# Patient Record
Sex: MALE | Race: WHITE | Employment: FULL TIME | URBAN - METROPOLITAN AREA
[De-identification: names, ages, dates, MRNs, and addresses within clinical notes are randomized per-mention and may not be internally consistent; named-entity substitution may affect disease eponyms.]

---

## 2017-01-03 ENCOUNTER — APPOINTMENT (OUTPATIENT)
Dept: RADIATION ONCOLOGY | Facility: HOSPITAL | Age: 62
End: 2017-01-03
Attending: RADIOLOGY
Payer: COMMERCIAL

## 2017-01-06 ENCOUNTER — OFFICE VISIT (OUTPATIENT)
Dept: RADIATION ONCOLOGY | Facility: HOSPITAL | Age: 62
End: 2017-01-06
Attending: RADIOLOGY
Payer: COMMERCIAL

## 2017-01-06 VITALS
RESPIRATION RATE: 18 BRPM | BODY MASS INDEX: 28.75 KG/M2 | HEART RATE: 63 BPM | WEIGHT: 224 LBS | HEIGHT: 74 IN | DIASTOLIC BLOOD PRESSURE: 81 MMHG | OXYGEN SATURATION: 100 % | TEMPERATURE: 98 F | SYSTOLIC BLOOD PRESSURE: 139 MMHG

## 2017-01-06 PROCEDURE — 99212 OFFICE O/P EST SF 10 MIN: CPT

## 2017-01-06 NOTE — PROGRESS NOTES
Primary language:  English  Language line required?  no  Comprehension Ability:  excellent  Able to read?  yes  Able to write? yes  Communication tools:  na  Patient's ability to learn:  excellent  Readiness to learn:   Motivated  Learning preferences:  Duncan Martin

## 2017-01-06 NOTE — PROGRESS NOTES
RADIATION ONCOLOGY NOTE    DATE OF VISIT: 1/6/2017  DIAGNOSIS :   Stage IV, [id number] adenocarcinoma of the prostate, s/p RRP/LND with +LN/+ margins and KEVIN, with detectable PSA post-op, currently started salvage hormonal therapy, for further adjuvant XRT. tablet by mouth once daily. Disp:  Rfl: 2       ALLERGIES :   No Known Allergies    PAST MEDICAL HISTORY:   has a past medical history of Pain in joint, lower leg; Cervicalgia; and Prostate cancer ([facility] [address]  He also has no past medical history of Venereal dise with Stage IV, [id number] adenocarcinoma of the prostate, s/p RRP/LND with +LN/+ margins and KEVIN, with detectable PSA post-op, currently started salvage hormonal therapy. Given his high risk features, I have discussed the role of  further adjuvant XRT.

## 2017-01-06 NOTE — PROGRESS NOTES
Primary language:  English  Language line required?  no  Comprehension Ability:  good  Able to read?  yes  Able to write? yes  Communication tools:  na  Patient's ability to learn:  good  Readiness to learn:   Motivated  Learning preferences:  Discussion

## 2017-01-06 NOTE — PROGRESS NOTES
Fatigue Plan Of Care:    Problem:  Fatigue    Problems related to:    Disease process  Side effect of therapy    Interventions:  Prioritize daily activities  Discuss methods to balance rest and activity  Promote excercise within patient's capability  Encou

## 2017-01-06 NOTE — PROGRESS NOTES
Nursing Consultation Note  Patient: Samantha Ramírez  YOB: 1955  Age: 62year old  Radiation Oncologist: Dr. Layla Gonzalez  Referring Physician: No ref. provider found  Diagnosis:No diagnosis found.   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 Pt accompanied by his wife. Discussed potential side effects of male pelvis radiation, including urinary changes, possible loose stools/diarrhea, fatigue and skin changes.

## 2017-01-06 NOTE — PROGRESS NOTES
Knowledge Deficit Plan Of Care:    Problem:  Knowledge Deficit    Problems related to:    Radiation therapy  Disease process    Interventions:  Assess patient knowledge level  Show Cyberknife video presentation  Assess knowledge needs  Instruct on the dise

## 2017-01-06 NOTE — PROGRESS NOTES
Diarrhea Plan Of Care:    Problem:  Diarrhea    Problems related to:    Side effects of treatment    Interventions:  Monitor bowel function  Instruct patient/family on low fat / low fiber diet  Avoid irritating foods  Provide skin care measures  Encourage

## 2017-01-17 PROCEDURE — 77334 RADIATION TREATMENT AID(S): CPT | Performed by: RADIOLOGY

## 2017-01-19 PROCEDURE — 77338 DESIGN MLC DEVICE FOR IMRT: CPT | Performed by: RADIOLOGY

## 2017-01-19 PROCEDURE — 77301 RADIOTHERAPY DOSE PLAN IMRT: CPT | Performed by: RADIOLOGY

## 2017-01-19 PROCEDURE — 77300 RADIATION THERAPY DOSE PLAN: CPT | Performed by: RADIOLOGY

## 2017-01-20 PROCEDURE — 77338 DESIGN MLC DEVICE FOR IMRT: CPT | Performed by: RADIOLOGY

## 2017-01-20 PROCEDURE — 77334 RADIATION TREATMENT AID(S): CPT | Performed by: RADIOLOGY

## 2017-01-24 ENCOUNTER — TELEPHONE (OUTPATIENT)
Dept: HEMATOLOGY/ONCOLOGY | Facility: HOSPITAL | Age: 62
End: 2017-01-24

## 2017-01-24 NOTE — TELEPHONE ENCOUNTER
PLEASE CALL PT TODAY AT [facility] HE WILL BEGIN RADIATION THERAPY-FLORENCIO

## 2017-01-25 PROCEDURE — 77280 THER RAD SIMULAJ FIELD SMPL: CPT | Performed by: RADIOLOGY

## 2017-01-25 PROCEDURE — 77385 HC IMRT SIMPLE: CPT | Performed by: RADIOLOGY

## 2017-01-26 PROCEDURE — 77385 HC IMRT SIMPLE: CPT | Performed by: RADIOLOGY

## 2017-01-27 PROCEDURE — 77336 RADIATION PHYSICS CONSULT: CPT | Performed by: RADIOLOGY

## 2017-01-27 PROCEDURE — 77385 HC IMRT SIMPLE: CPT | Performed by: RADIOLOGY

## 2017-01-30 ENCOUNTER — OFFICE VISIT (OUTPATIENT)
Dept: RADIATION ONCOLOGY | Facility: HOSPITAL | Age: 62
End: 2017-01-30
Attending: RADIOLOGY
Payer: COMMERCIAL

## 2017-01-30 VITALS
HEIGHT: 74 IN | HEART RATE: 71 BPM | BODY MASS INDEX: 28.83 KG/M2 | SYSTOLIC BLOOD PRESSURE: 121 MMHG | RESPIRATION RATE: 16 BRPM | DIASTOLIC BLOOD PRESSURE: 65 MMHG | WEIGHT: 224.63 LBS

## 2017-01-30 DIAGNOSIS — C61 PROSTATE CANCER (HCC): Primary | ICD-10-CM

## 2017-01-30 PROCEDURE — 77385 HC IMRT SIMPLE: CPT | Performed by: RADIOLOGY

## 2017-01-30 NOTE — PROGRESS NOTES
[facility] Radiation Treatment Management Note 1-5    Patient:  Serge Duverney  Age:  62year old  Visit Diagnosis:    1.  Prostate cancer [facility])      Primary Rad/Onc:  Dr. Laura Goodman      Site Delivered Dose (Gy) Prescribed Dose (Gy) Lidia Herrera

## 2017-01-31 PROCEDURE — 77385 HC IMRT SIMPLE: CPT | Performed by: RADIOLOGY

## 2017-02-01 ENCOUNTER — APPOINTMENT (OUTPATIENT)
Dept: RADIATION ONCOLOGY | Facility: HOSPITAL | Age: 62
End: 2017-02-01
Attending: RADIOLOGY
Payer: COMMERCIAL

## 2017-02-01 PROCEDURE — 77385 HC IMRT SIMPLE: CPT | Performed by: RADIOLOGY

## 2017-02-02 PROCEDURE — 77385 HC IMRT SIMPLE: CPT | Performed by: RADIOLOGY

## 2017-02-03 PROCEDURE — 77385 HC IMRT SIMPLE: CPT | Performed by: RADIOLOGY

## 2017-02-03 PROCEDURE — 77336 RADIATION PHYSICS CONSULT: CPT | Performed by: RADIOLOGY

## 2017-02-06 ENCOUNTER — OFFICE VISIT (OUTPATIENT)
Dept: RADIATION ONCOLOGY | Facility: HOSPITAL | Age: 62
End: 2017-02-06
Attending: RADIOLOGY
Payer: COMMERCIAL

## 2017-02-06 VITALS
WEIGHT: 229.19 LBS | SYSTOLIC BLOOD PRESSURE: 123 MMHG | DIASTOLIC BLOOD PRESSURE: 63 MMHG | RESPIRATION RATE: 16 BRPM | BODY MASS INDEX: 29 KG/M2 | HEART RATE: 76 BPM

## 2017-02-06 DIAGNOSIS — C61 PROSTATE CANCER (HCC): Primary | ICD-10-CM

## 2017-02-06 PROCEDURE — 77385 HC IMRT SIMPLE: CPT | Performed by: RADIOLOGY

## 2017-02-06 NOTE — PROGRESS NOTES
[facility] Radiation Treatment Management Note 6-10    Patient:  Sid Whitt  Age:  62year old  Visit Diagnosis:    1.  Prostate cancer [facility])      Primary Rad/Onc:  Dr. Kym Goodman      Site Delivered Dose (Gy) Prescribed Dose (Gy) Juwan Farmer

## 2017-02-07 PROCEDURE — 77385 HC IMRT SIMPLE: CPT | Performed by: RADIOLOGY

## 2017-02-08 PROCEDURE — 77385 HC IMRT SIMPLE: CPT | Performed by: RADIOLOGY

## 2017-02-09 PROCEDURE — 77385 HC IMRT SIMPLE: CPT | Performed by: RADIOLOGY

## 2017-02-10 PROCEDURE — 77336 RADIATION PHYSICS CONSULT: CPT | Performed by: RADIOLOGY

## 2017-02-10 PROCEDURE — 77385 HC IMRT SIMPLE: CPT | Performed by: RADIOLOGY

## 2017-02-13 ENCOUNTER — OFFICE VISIT (OUTPATIENT)
Dept: RADIATION ONCOLOGY | Facility: HOSPITAL | Age: 62
End: 2017-02-13
Attending: RADIOLOGY
Payer: COMMERCIAL

## 2017-02-13 VITALS
DIASTOLIC BLOOD PRESSURE: 83 MMHG | RESPIRATION RATE: 18 BRPM | HEART RATE: 70 BPM | BODY MASS INDEX: 29 KG/M2 | WEIGHT: 227.81 LBS | SYSTOLIC BLOOD PRESSURE: 137 MMHG

## 2017-02-13 DIAGNOSIS — C61 PROSTATE CANCER (HCC): Primary | ICD-10-CM

## 2017-02-13 PROCEDURE — 77385 HC IMRT SIMPLE: CPT | Performed by: RADIOLOGY

## 2017-02-13 NOTE — PROGRESS NOTES
[facility] Radiation Treatment Management Note 11-15    Patient:  Kwame Moreira  Age:  62year old  Visit Diagnosis:    1.  Prostate cancer [facility])      Primary Rad/Onc:  Dr. Demarcus Goodman      Site Delivered Dose (Gy) Prescribed Dose (Gy) Edgar Nichols

## 2017-02-14 PROCEDURE — 77385 HC IMRT SIMPLE: CPT | Performed by: RADIOLOGY

## 2017-02-15 PROCEDURE — 77385 HC IMRT SIMPLE: CPT | Performed by: RADIOLOGY

## 2017-02-16 PROCEDURE — 77385 HC IMRT SIMPLE: CPT | Performed by: RADIOLOGY

## 2017-02-17 PROCEDURE — 77385 HC IMRT SIMPLE: CPT | Performed by: RADIOLOGY

## 2017-02-17 PROCEDURE — 77336 RADIATION PHYSICS CONSULT: CPT | Performed by: RADIOLOGY

## 2017-02-20 ENCOUNTER — OFFICE VISIT (OUTPATIENT)
Dept: RADIATION ONCOLOGY | Facility: HOSPITAL | Age: 62
End: 2017-02-20
Attending: RADIOLOGY
Payer: COMMERCIAL

## 2017-02-20 VITALS
DIASTOLIC BLOOD PRESSURE: 78 MMHG | WEIGHT: 225.19 LBS | RESPIRATION RATE: 18 BRPM | HEART RATE: 84 BPM | BODY MASS INDEX: 28.9 KG/M2 | SYSTOLIC BLOOD PRESSURE: 116 MMHG | HEIGHT: 74 IN

## 2017-02-20 DIAGNOSIS — C61 PROSTATE CANCER (HCC): Primary | ICD-10-CM

## 2017-02-20 PROCEDURE — 77385 HC IMRT SIMPLE: CPT | Performed by: RADIOLOGY

## 2017-02-20 NOTE — PROGRESS NOTES
[facility] Radiation Treatment Management Note 16-20    Patient:  Jasson Bañuelos  Age:  62year old  Visit Diagnosis:    1.  Prostate cancer [facility])      Primary Rad/Onc:  Dr. Scott Diehl Goodman      Site Delivered Dose (Gy) Prescribed Dose (Gy) Pedrito Willis

## 2017-02-21 PROCEDURE — 77385 HC IMRT SIMPLE: CPT | Performed by: RADIOLOGY

## 2017-02-22 PROCEDURE — 77385 HC IMRT SIMPLE: CPT | Performed by: RADIOLOGY

## 2017-02-23 PROCEDURE — 77385 HC IMRT SIMPLE: CPT | Performed by: RADIOLOGY

## 2017-02-24 ENCOUNTER — TELEPHONE (OUTPATIENT)
Dept: SURGERY | Facility: CLINIC | Age: 62
End: 2017-02-24

## 2017-02-24 PROCEDURE — 77336 RADIATION PHYSICS CONSULT: CPT | Performed by: RADIOLOGY

## 2017-02-24 PROCEDURE — 77385 HC IMRT SIMPLE: CPT | Performed by: RADIOLOGY

## 2017-02-27 ENCOUNTER — OFFICE VISIT (OUTPATIENT)
Dept: RADIATION ONCOLOGY | Facility: HOSPITAL | Age: 62
End: 2017-02-27
Attending: RADIOLOGY
Payer: COMMERCIAL

## 2017-02-27 VITALS
SYSTOLIC BLOOD PRESSURE: 127 MMHG | HEART RATE: 80 BPM | WEIGHT: 231.19 LBS | RESPIRATION RATE: 16 BRPM | DIASTOLIC BLOOD PRESSURE: 75 MMHG | BODY MASS INDEX: 30 KG/M2

## 2017-02-27 DIAGNOSIS — C61 PROSTATE CANCER (HCC): Primary | ICD-10-CM

## 2017-02-27 PROCEDURE — 77385 HC IMRT SIMPLE: CPT | Performed by: RADIOLOGY

## 2017-02-28 PROCEDURE — 77385 HC IMRT SIMPLE: CPT | Performed by: RADIOLOGY

## 2017-03-01 ENCOUNTER — APPOINTMENT (OUTPATIENT)
Dept: RADIATION ONCOLOGY | Facility: HOSPITAL | Age: 62
End: 2017-03-01
Attending: RADIOLOGY
Payer: COMMERCIAL

## 2017-03-01 PROCEDURE — 77385 HC IMRT SIMPLE: CPT | Performed by: RADIOLOGY

## 2017-03-02 PROCEDURE — 77385 HC IMRT SIMPLE: CPT | Performed by: RADIOLOGY

## 2017-03-02 NOTE — TELEPHONE ENCOUNTER
I spoke with pt's spouse and told her that I do not find any ins. Forms here in the nurses station for pt. I suggested that she contact the [employer] dept and I gave her the ph and fx #'s and I also suggested the billing dept.  Since she stated that it had someth

## 2017-03-03 PROCEDURE — 77385 HC IMRT SIMPLE: CPT | Performed by: RADIOLOGY

## 2017-03-03 PROCEDURE — 77336 RADIATION PHYSICS CONSULT: CPT | Performed by: RADIOLOGY

## 2017-03-06 ENCOUNTER — OFFICE VISIT (OUTPATIENT)
Dept: RADIATION ONCOLOGY | Facility: HOSPITAL | Age: 62
End: 2017-03-06
Attending: RADIOLOGY
Payer: COMMERCIAL

## 2017-03-06 VITALS
HEART RATE: 70 BPM | SYSTOLIC BLOOD PRESSURE: 128 MMHG | WEIGHT: 230.63 LBS | RESPIRATION RATE: 16 BRPM | BODY MASS INDEX: 30 KG/M2 | DIASTOLIC BLOOD PRESSURE: 56 MMHG

## 2017-03-06 DIAGNOSIS — C61 PROSTATE CANCER (HCC): Primary | ICD-10-CM

## 2017-03-06 PROCEDURE — 77385 HC IMRT SIMPLE: CPT | Performed by: RADIOLOGY

## 2017-03-06 NOTE — PROGRESS NOTES
[facility] Radiation Treatment Management Note 26-30    Patient:  Jean-Claude Johns  Age:  62year old  Visit Diagnosis:    1.  Prostate cancer [facility])      Primary Rad/Onc:  Dr. Rachell Peoples Goodman      Site Delivered Dose (Gy) Prescribed Dose (Gy) Ortega Mas

## 2017-03-07 PROCEDURE — 77385 HC IMRT SIMPLE: CPT | Performed by: RADIOLOGY

## 2017-03-08 PROCEDURE — 77385 HC IMRT SIMPLE: CPT | Performed by: RADIOLOGY

## 2017-03-09 PROCEDURE — 77385 HC IMRT SIMPLE: CPT | Performed by: RADIOLOGY

## 2017-03-10 PROCEDURE — 77385 HC IMRT SIMPLE: CPT | Performed by: RADIOLOGY

## 2017-03-10 PROCEDURE — 77336 RADIATION PHYSICS CONSULT: CPT | Performed by: RADIOLOGY

## 2017-03-13 ENCOUNTER — OFFICE VISIT (OUTPATIENT)
Dept: RADIATION ONCOLOGY | Facility: HOSPITAL | Age: 62
End: 2017-03-13
Attending: RADIOLOGY
Payer: COMMERCIAL

## 2017-03-13 VITALS
RESPIRATION RATE: 16 BRPM | WEIGHT: 233.63 LBS | SYSTOLIC BLOOD PRESSURE: 134 MMHG | HEIGHT: 74 IN | HEART RATE: 63 BPM | DIASTOLIC BLOOD PRESSURE: 73 MMHG | BODY MASS INDEX: 29.98 KG/M2

## 2017-03-13 DIAGNOSIS — C61 PROSTATE CANCER (HCC): Primary | ICD-10-CM

## 2017-03-13 PROCEDURE — 77385 HC IMRT SIMPLE: CPT | Performed by: RADIOLOGY

## 2017-03-13 NOTE — PROGRESS NOTES
[facility] Radiation Treatment Management Note 31-35    Patient:  Jimi Fernandez  Age:  62year old  Visit Diagnosis:    1.  Prostate cancer [facility])      Primary Rad/Onc:  Dr. Gavino Ward Goodman      Site Delivered Dose (Gy) Prescribed Dose (Gy) Song Meza

## 2017-03-14 PROCEDURE — 77385 HC IMRT SIMPLE: CPT | Performed by: RADIOLOGY

## 2017-03-15 PROCEDURE — 77385 HC IMRT SIMPLE: CPT | Performed by: RADIOLOGY

## 2017-03-16 PROCEDURE — 77385 HC IMRT SIMPLE: CPT | Performed by: RADIOLOGY

## 2017-03-17 PROCEDURE — 77336 RADIATION PHYSICS CONSULT: CPT | Performed by: RADIOLOGY

## 2017-03-17 PROCEDURE — 77385 HC IMRT SIMPLE: CPT | Performed by: RADIOLOGY

## 2017-03-20 ENCOUNTER — OFFICE VISIT (OUTPATIENT)
Dept: RADIATION ONCOLOGY | Facility: HOSPITAL | Age: 62
End: 2017-03-20
Attending: RADIOLOGY
Payer: COMMERCIAL

## 2017-03-20 VITALS
RESPIRATION RATE: 16 BRPM | BODY MASS INDEX: 29.16 KG/M2 | WEIGHT: 227.19 LBS | SYSTOLIC BLOOD PRESSURE: 120 MMHG | HEIGHT: 74 IN | DIASTOLIC BLOOD PRESSURE: 77 MMHG | HEART RATE: 78 BPM

## 2017-03-20 DIAGNOSIS — C61 PROSTATE CANCER (HCC): Primary | ICD-10-CM

## 2017-03-20 PROCEDURE — 77385 HC IMRT SIMPLE: CPT | Performed by: RADIOLOGY

## 2017-03-20 NOTE — PROGRESS NOTES
RADIATION ONCOLOGY COMPLETION SUMMARY NOTE    DIAGNOSIS :   Stage IV, [id number] adenocarcinoma of the prostate, s/p RRP/LND with +LN/+ margins and KEVIN, with detectable PSA post-op, currently started salvage hormonal therapy, s/p adjuvant XRT, completing tx o the dose was delivered as planned. Thank you very much for allowing me to take care of your patient. Pt had the expected side effects of grade 1 /GI symptoms and will use OTC product for his external hemorrhoids.     He also tolerating hormonal

## 2017-03-24 PROCEDURE — 77336 RADIATION PHYSICS CONSULT: CPT | Performed by: RADIOLOGY

## 2017-04-25 ENCOUNTER — TELEPHONE (OUTPATIENT)
Dept: HEMATOLOGY/ONCOLOGY | Facility: HOSPITAL | Age: 62
End: 2017-04-25

## 2017-04-25 NOTE — TELEPHONE ENCOUNTER
I returned Silvio's call. He is requesting an appt to see Dr Timothy Goldberg this week. He wants to speak with Dr Timothy Goldberg about symptoms he has been having since getting his Trelstar injection 12/8/2017. He is due for his next injection on 5/25/2017.       Tamera

## 2017-04-25 NOTE — TELEPHONE ENCOUNTER
Genie Boogie was transferred to me to make an appointment with Dr. Stephane Rueda. He states he \"has been seen by Dr. Stephane Rueda in the past. Has finished RT and has had a Lupron injection.  I am having reactions to the Lupron and would like to see Dr. Stephane Rueda as soon as po

## 2017-04-26 ENCOUNTER — TELEPHONE (OUTPATIENT)
Dept: HEMATOLOGY/ONCOLOGY | Facility: HOSPITAL | Age: 62
End: 2017-04-26

## 2017-04-26 ENCOUNTER — OFFICE VISIT (OUTPATIENT)
Dept: HEMATOLOGY/ONCOLOGY | Facility: HOSPITAL | Age: 62
End: 2017-04-26
Attending: INTERNAL MEDICINE
Payer: COMMERCIAL

## 2017-04-26 VITALS
SYSTOLIC BLOOD PRESSURE: 110 MMHG | DIASTOLIC BLOOD PRESSURE: 85 MMHG | RESPIRATION RATE: 18 BRPM | HEIGHT: 74 IN | BODY MASS INDEX: 29.52 KG/M2 | WEIGHT: 230 LBS | HEART RATE: 80 BPM

## 2017-04-26 DIAGNOSIS — C61 PROSTATE CANCER (HCC): Primary | ICD-10-CM

## 2017-04-26 DIAGNOSIS — R53.83 OTHER FATIGUE: ICD-10-CM

## 2017-04-26 DIAGNOSIS — G62.9 NEUROPATHY: ICD-10-CM

## 2017-04-26 PROCEDURE — 36415 COLL VENOUS BLD VENIPUNCTURE: CPT

## 2017-04-26 PROCEDURE — 99212 OFFICE O/P EST SF 10 MIN: CPT | Performed by: INTERNAL MEDICINE

## 2017-04-26 PROCEDURE — 99214 OFFICE O/P EST MOD 30 MIN: CPT | Performed by: INTERNAL MEDICINE

## 2017-04-26 RX ORDER — MELATONIN
1000 DAILY
COMMUNITY

## 2017-04-27 NOTE — PROGRESS NOTES
[facility]    PATIENT'S NAME: Katrin Dobbins   ATTENDING PHYSICIAN: Sonal Turner MD   PATIENT ACCOUNT #: [de-identified] LOCATION: [address] [place] [address] RECORD #: [id number] YOB: 1955   DATE OF SERVICE: 04/26/2017       CANCER CE not had any other recent blood work taken. He sees Dr. Adam Cardozo for his general medical care and does not remember when the exact first set of labs was done but thinks it was around the time he was diagnosed with prostate cancer.   His current medications inclu symptoms. His PSA is 0, which is reassuring. I have suggested that he see us again in about 6 months with repeat testing. He will be back in May for his triptorelin injections.     Dictated By Andreina Geronimo MD  d: 04/26/2017 16:05:49  t: 04/26/2017 1

## 2017-05-25 ENCOUNTER — NURSE ONLY (OUTPATIENT)
Dept: HEMATOLOGY/ONCOLOGY | Facility: HOSPITAL | Age: 62
End: 2017-05-25
Attending: INTERNAL MEDICINE
Payer: COMMERCIAL

## 2017-05-25 VITALS — HEART RATE: 67 BPM | TEMPERATURE: 99 F | SYSTOLIC BLOOD PRESSURE: 125 MMHG | DIASTOLIC BLOOD PRESSURE: 73 MMHG

## 2017-05-25 DIAGNOSIS — C61 PROSTATE CANCER (HCC): Primary | ICD-10-CM

## 2017-05-25 PROCEDURE — 96402 CHEMO HORMON ANTINEOPL SQ/IM: CPT

## 2017-05-25 NOTE — PROGRESS NOTES
Patient here for Trelstar injection. Patient states buttocks is sore for 2-3 days after injection, states intermittent fatigue, and becomes emotional at times. Trelstar given IM in right gluteal muscle, tolerated injection well.   Slight bleeding noted, g

## 2017-06-01 ENCOUNTER — TELEPHONE (OUTPATIENT)
Dept: HEMATOLOGY/ONCOLOGY | Facility: HOSPITAL | Age: 62
End: 2017-06-01

## 2017-06-01 ENCOUNTER — APPOINTMENT (OUTPATIENT)
Dept: RADIATION ONCOLOGY | Facility: HOSPITAL | Age: 62
End: 2017-06-01
Attending: RADIOLOGY
Payer: COMMERCIAL

## 2017-06-13 ENCOUNTER — TELEPHONE (OUTPATIENT)
Dept: RADIATION ONCOLOGY | Facility: HOSPITAL | Age: 62
End: 2017-06-13

## 2017-06-13 NOTE — TELEPHONE ENCOUNTER
Per Jf Kwon, I contacted Genie Keagan to set up a follow-up appointment following his completion of radiation therapy 3/20/17 for prostate cancer.   Adrian Allen stated that he recently saw Dr. Stephane Rueda in follow-up and labs were drawn at that time.   He quest

## 2017-06-21 ENCOUNTER — TELEPHONE (OUTPATIENT)
Dept: HEMATOLOGY/ONCOLOGY | Facility: HOSPITAL | Age: 62
End: 2017-06-21

## 2017-06-21 NOTE — TELEPHONE ENCOUNTER
Toxicities:  Trelstar injection # 2 of 4 received on 5/25/2017      Weakness/Fatigue/Bilateral leg pain/Mood Swings        Weakness: (Kate Burciaga reports Justin Olmstead has had decreased strength in his arms & legs since receiving his second injection.  He has less stam

## 2017-06-28 ENCOUNTER — OFFICE VISIT (OUTPATIENT)
Dept: HEMATOLOGY/ONCOLOGY | Facility: HOSPITAL | Age: 62
End: 2017-06-28
Attending: INTERNAL MEDICINE
Payer: COMMERCIAL

## 2017-06-28 VITALS
HEIGHT: 74 IN | DIASTOLIC BLOOD PRESSURE: 69 MMHG | TEMPERATURE: 98 F | BODY MASS INDEX: 29.52 KG/M2 | SYSTOLIC BLOOD PRESSURE: 132 MMHG | RESPIRATION RATE: 18 BRPM | HEART RATE: 76 BPM | WEIGHT: 230 LBS

## 2017-06-28 DIAGNOSIS — C61 PROSTATE CANCER (HCC): ICD-10-CM

## 2017-06-28 DIAGNOSIS — Z79.818 ANDROGEN DEPRIVATION THERAPY: ICD-10-CM

## 2017-06-28 DIAGNOSIS — R53.83 OTHER FATIGUE: Primary | ICD-10-CM

## 2017-06-28 PROBLEM — R53.82 CHRONIC FATIGUE: Status: ACTIVE | Noted: 2017-06-28

## 2017-06-28 PROBLEM — IMO0001 ANDROGEN DEPRIVATION THERAPY: Status: ACTIVE | Noted: 2017-06-28

## 2017-06-28 PROCEDURE — 99212 OFFICE O/P EST SF 10 MIN: CPT | Performed by: INTERNAL MEDICINE

## 2017-06-29 NOTE — PROGRESS NOTES
[facility]    PATIENT'S NAME: Maryjane Allena   ATTENDING PHYSICIAN: Rylie Gerber MD   PATIENT ACCOUNT #: [de-identified] LOCATION: [address] [place] [address] RECORD #: [id number] YOB: 1955   DATE OF SERVICE: 06/28/2017       CANCER CE bit more over the weekend though he still seems to put in a full day on Saturday as well. He is having modest hot flashes. He is having no night sweats and no drenching sweats during the night.   He is also frustrated by billing issues related to his canc weakness, some central adiposity, and overall decreased strength and decreased endurance. He has received his second dose of androgen depravation therapy as of May 25. The plan is to treat him for a total of 2 years given his high-risk situation.   He und

## 2017-08-22 ENCOUNTER — TELEPHONE (OUTPATIENT)
Dept: SURGERY | Facility: CLINIC | Age: 62
End: 2017-08-22

## 2017-08-28 NOTE — TELEPHONE ENCOUNTER
Spoke with pt's spouse and informed her that I could not discuss any medical info about pt's case as I di not have a signed [employer] on file to speak with her on pt's behalf.  She then informed that pt is asking for a letter stating that pt was not diagnosed wit

## 2017-09-06 ENCOUNTER — TELEPHONE (OUTPATIENT)
Dept: HEMATOLOGY/ONCOLOGY | Facility: HOSPITAL | Age: 62
End: 2017-09-06

## 2017-09-06 NOTE — TELEPHONE ENCOUNTER
Wife and patient need a letter from Dr Tita Martini stating the date he was diagnosed. According to wife, pt was diagnosed in August 2016. They took out a policy in May 3722.  Insurance is refusing to pay any medical bills because they are saying pt had a pre-exi

## 2017-09-08 ENCOUNTER — TELEPHONE (OUTPATIENT)
Dept: HEMATOLOGY/ONCOLOGY | Facility: HOSPITAL | Age: 62
End: 2017-09-08

## 2017-09-08 NOTE — TELEPHONE ENCOUNTER
LMOVM stating the letter Dr. Timothy Goldberg prepared for his insurance is ready. I can either mail the letter or it may be picked up at the .

## 2017-11-08 ENCOUNTER — OFFICE VISIT (OUTPATIENT)
Dept: HEMATOLOGY/ONCOLOGY | Facility: HOSPITAL | Age: 62
End: 2017-11-08
Attending: INTERNAL MEDICINE
Payer: COMMERCIAL

## 2017-11-08 VITALS
SYSTOLIC BLOOD PRESSURE: 133 MMHG | HEIGHT: 74 IN | BODY MASS INDEX: 30.8 KG/M2 | HEART RATE: 71 BPM | WEIGHT: 240 LBS | RESPIRATION RATE: 18 BRPM | DIASTOLIC BLOOD PRESSURE: 72 MMHG | TEMPERATURE: 98 F

## 2017-11-08 DIAGNOSIS — L30.9 ECZEMA, UNSPECIFIED TYPE: ICD-10-CM

## 2017-11-08 DIAGNOSIS — C61 PROSTATE CANCER (HCC): Primary | ICD-10-CM

## 2017-11-08 PROCEDURE — 99212 OFFICE O/P EST SF 10 MIN: CPT | Performed by: INTERNAL MEDICINE

## 2017-11-08 PROCEDURE — 99213 OFFICE O/P EST LOW 20 MIN: CPT | Performed by: INTERNAL MEDICINE

## 2017-11-08 RX ORDER — ASPIRIN 81 MG/1
81 TABLET ORAL DAILY
COMMUNITY

## 2017-11-08 NOTE — PROGRESS NOTES
[facility]    PATIENT'S NAME: Jennifer Sola   ATTENDING PHYSICIAN: Liban Anton MD   PATIENT ACCOUNT #: [de-identified] LOCATION: [address] [place] [address] RECORD #: [id number] YOB: 1955   DATE OF SERVICE: 11/08/2017       CANCER CE performance status is 1. Weight 240 pounds, which is up 10 pounds from his last visit. Blood pressure 133/72, pulse 71, respiratory rate 20, temperature 97.9. HEENT:  Unremarkable. He has pink conjunctivae, anicteric sclerae.   Pharynx without lesions

## 2017-11-09 ENCOUNTER — TELEPHONE (OUTPATIENT)
Dept: HEMATOLOGY/ONCOLOGY | Facility: HOSPITAL | Age: 62
End: 2017-11-09

## 2017-11-09 ENCOUNTER — NURSE ONLY (OUTPATIENT)
Dept: HEMATOLOGY/ONCOLOGY | Facility: HOSPITAL | Age: 62
End: 2017-11-09
Attending: INTERNAL MEDICINE
Payer: COMMERCIAL

## 2017-11-09 VITALS
HEART RATE: 90 BPM | SYSTOLIC BLOOD PRESSURE: 128 MMHG | TEMPERATURE: 99 F | DIASTOLIC BLOOD PRESSURE: 72 MMHG | RESPIRATION RATE: 16 BRPM

## 2017-11-09 DIAGNOSIS — C61 PROSTATE CANCER (HCC): Primary | ICD-10-CM

## 2017-11-09 PROCEDURE — 84153 ASSAY OF PSA TOTAL: CPT

## 2017-11-09 PROCEDURE — 96402 CHEMO HORMON ANTINEOPL SQ/IM: CPT

## 2017-11-09 PROCEDURE — 36415 COLL VENOUS BLD VENIPUNCTURE: CPT

## 2017-11-09 PROCEDURE — 96372 THER/PROPH/DIAG INJ SC/IM: CPT

## 2017-11-09 NOTE — PROGRESS NOTES
Patient here for Trelstar injection. States he is tired all the time, and becomes emotional at times. Patient reports hot flashes and he is getting flabby. Trelstar given IM in left gluteal muscle, tolerated injection well.   Slight bleeding noted, gauze a

## 2018-02-14 ENCOUNTER — OFFICE VISIT (OUTPATIENT)
Dept: HEMATOLOGY/ONCOLOGY | Facility: HOSPITAL | Age: 63
End: 2018-02-14
Attending: INTERNAL MEDICINE
Payer: COMMERCIAL

## 2018-02-14 VITALS
RESPIRATION RATE: 16 BRPM | HEART RATE: 63 BPM | BODY MASS INDEX: 30.29 KG/M2 | HEIGHT: 74 IN | WEIGHT: 236 LBS | TEMPERATURE: 98 F | DIASTOLIC BLOOD PRESSURE: 63 MMHG | SYSTOLIC BLOOD PRESSURE: 114 MMHG

## 2018-02-14 DIAGNOSIS — R53.83 OTHER FATIGUE: ICD-10-CM

## 2018-02-14 DIAGNOSIS — Z79.818 ANDROGEN DEPRIVATION THERAPY: ICD-10-CM

## 2018-02-14 DIAGNOSIS — C61 PROSTATE CANCER (HCC): Primary | ICD-10-CM

## 2018-02-14 PROBLEM — F06.4 ANXIETY DISORDER DUE TO GENERAL MEDICAL CONDITION: Status: ACTIVE | Noted: 2018-02-14

## 2018-02-14 PROCEDURE — 99212 OFFICE O/P EST SF 10 MIN: CPT | Performed by: INTERNAL MEDICINE

## 2018-02-14 PROCEDURE — 99214 OFFICE O/P EST MOD 30 MIN: CPT | Performed by: INTERNAL MEDICINE

## 2018-02-14 RX ORDER — VENLAFAXINE HYDROCHLORIDE 37.5 MG/1
37.5 CAPSULE, EXTENDED RELEASE ORAL DAILY
Qty: 7 CAPSULE | Refills: 0 | Status: SHIPPED | OUTPATIENT
Start: 2018-02-14 | End: 2018-02-21

## 2018-02-14 RX ORDER — ALPRAZOLAM 0.25 MG/1
TABLET ORAL 2 TIMES DAILY PRN
Qty: 60 TABLET | Refills: 1 | Status: SHIPPED | OUTPATIENT
Start: 2018-02-14

## 2018-02-14 RX ORDER — VENLAFAXINE HYDROCHLORIDE 75 MG/1
75 CAPSULE, EXTENDED RELEASE ORAL DAILY
Qty: 30 CAPSULE | Refills: 11 | Status: SHIPPED | OUTPATIENT
Start: 2018-02-14 | End: 2018-12-14

## 2018-02-14 RX ORDER — NAPROXEN 500 MG/1
500 TABLET ORAL AS NEEDED
COMMUNITY
End: 2020-07-08 | Stop reason: ALTCHOICE

## 2018-02-14 NOTE — PROGRESS NOTES
[facility]    PATIENT'S NAME: Maryjane Allena   ATTENDING PHYSICIAN: Rylie Gerber MD   PATIENT ACCOUNT #: [de-identified] LOCATION: [address] [place] [address] RECORD #: [id number] YOB: 1955   DATE OF SERVICE: 02/14/2018       CANCER CE include aspirin 81 mg daily; vitamin D 1000 units daily; lisinopril/hydrochlorothiazide 20/12.5 daily; naproxen 500 mg b.i.d. p.r.n.; and vitamin B12, 1000 mcg daily.     PHYSICAL EXAMINATION:    GENERAL:  He is a well-developed, well-nourished male in no a

## 2018-03-14 ENCOUNTER — TELEPHONE (OUTPATIENT)
Dept: HEMATOLOGY/ONCOLOGY | Facility: HOSPITAL | Age: 63
End: 2018-03-14

## 2018-03-14 NOTE — TELEPHONE ENCOUNTER
John Lopez wanted to let the doctor know that the medication is working great. John Lopez can be reached at [phone].  Please Advise

## 2018-04-26 ENCOUNTER — TELEPHONE (OUTPATIENT)
Dept: HEMATOLOGY/ONCOLOGY | Facility: HOSPITAL | Age: 63
End: 2018-04-26

## 2018-04-26 ENCOUNTER — NURSE ONLY (OUTPATIENT)
Dept: HEMATOLOGY/ONCOLOGY | Facility: HOSPITAL | Age: 63
End: 2018-04-26
Attending: INTERNAL MEDICINE
Payer: COMMERCIAL

## 2018-04-26 DIAGNOSIS — C61 PROSTATE CANCER (HCC): Primary | ICD-10-CM

## 2018-04-26 PROCEDURE — 84153 ASSAY OF PSA TOTAL: CPT

## 2018-04-26 PROCEDURE — 96402 CHEMO HORMON ANTINEOPL SQ/IM: CPT

## 2018-04-26 PROCEDURE — 36415 COLL VENOUS BLD VENIPUNCTURE: CPT

## 2018-04-26 NOTE — PROGRESS NOTES
Patient arrives for trelstar injection 2of 2. Patient states he is feeling well, states the trelstar makes him feel \"flabby\" and he can't wait until he is no longer on it.  Patient states he usually has lab test drawn too, PSA ordered in order inquiry for

## 2018-05-02 ENCOUNTER — OFFICE VISIT (OUTPATIENT)
Dept: HEMATOLOGY/ONCOLOGY | Facility: HOSPITAL | Age: 63
End: 2018-05-02
Attending: INTERNAL MEDICINE
Payer: COMMERCIAL

## 2018-05-02 VITALS
TEMPERATURE: 98 F | WEIGHT: 244 LBS | DIASTOLIC BLOOD PRESSURE: 80 MMHG | HEART RATE: 80 BPM | SYSTOLIC BLOOD PRESSURE: 129 MMHG | HEIGHT: 74 IN | RESPIRATION RATE: 16 BRPM | BODY MASS INDEX: 31.32 KG/M2

## 2018-05-02 DIAGNOSIS — R53.83 OTHER FATIGUE: ICD-10-CM

## 2018-05-02 DIAGNOSIS — C61 PROSTATE CANCER (HCC): Primary | ICD-10-CM

## 2018-05-02 DIAGNOSIS — F06.4 ANXIETY DISORDER DUE TO GENERAL MEDICAL CONDITION: ICD-10-CM

## 2018-05-02 PROCEDURE — 99213 OFFICE O/P EST LOW 20 MIN: CPT | Performed by: INTERNAL MEDICINE

## 2018-05-02 NOTE — PROGRESS NOTES
[facility]    PATIENT'S NAME: Jennifer Sola   ATTENDING PHYSICIAN: Liban Anton MD   PATIENT ACCOUNT #: [de-identified] LOCATION: [address] [place] [address] RECORD #: [id number] YOB: 1955   DATE OF SERVICE: 05/02/2018       CANCER CE daily, naproxen 500 mg b.i.d. p.r.n., triamcinolone topical cream p.r.n., venlafaxine extended release 75 mg once daily, and vitamin B12 of 1000 mcg daily.   He has been using the naproxen because he is planning on having a knee replacement later this summe sooner if his PSA starts climbing again. Dictated By Selma Angelo MD  d: 05/02/2018 14:48:09  t: 05/02/2018 15:03:30  Job [id number]  /    cc: MD Libertad Mariano Junior, MD Jeralene Cure, MD Dr. Mervyn Ka. Rondall Pellet

## 2018-12-13 ENCOUNTER — TELEPHONE (OUTPATIENT)
Dept: HEMATOLOGY/ONCOLOGY | Facility: HOSPITAL | Age: 63
End: 2018-12-13

## 2018-12-14 ENCOUNTER — TELEPHONE (OUTPATIENT)
Dept: HEMATOLOGY/ONCOLOGY | Facility: HOSPITAL | Age: 63
End: 2018-12-14

## 2018-12-14 RX ORDER — VENLAFAXINE HYDROCHLORIDE 75 MG/1
75 CAPSULE, EXTENDED RELEASE ORAL DAILY
Qty: 30 CAPSULE | Refills: 11 | Status: SHIPPED | OUTPATIENT
Start: 2018-12-14 | End: 2019-12-09

## 2018-12-14 NOTE — TELEPHONE ENCOUNTER
Silvio calling again asking for refill on Venlafaxine HCl ER 75 MG Oral Capsule SR 24 Hr qty 90day supply.  roberto carlos

## 2019-01-24 ENCOUNTER — TELEPHONE (OUTPATIENT)
Dept: HEMATOLOGY/ONCOLOGY | Facility: HOSPITAL | Age: 64
End: 2019-01-24

## 2019-01-24 DIAGNOSIS — C61 PROSTATE CANCER (HCC): Primary | ICD-10-CM
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
## 2019-01-24 NOTE — TELEPHONE ENCOUNTER
Silvio calling asking about his lupron is wearing off. He has been feeling dizzy and possible vertigo and wants to know if being off the medication could cause this, also should he make a f/u appt and do PSA.  [address] [facility]

## 2019-01-28 ENCOUNTER — APPOINTMENT (OUTPATIENT)
Dept: LAB | Facility: HOSPITAL | Age: 64
End: 2019-01-28
Attending: PHYSICIAN ASSISTANT
Payer: COMMERCIAL

## 2019-01-28 DIAGNOSIS — C61 PROSTATE CANCER (HCC): ICD-10-CM

## 2019-01-28 LAB
PSA SERPL-MCNC: 0 NG/ML (ref 0–4)
PSA SERPL-MCNC: <0.01 NG/ML (ref 0.01–4)

## 2019-01-28 PROCEDURE — 84153 ASSAY OF PSA TOTAL: CPT

## 2019-01-28 PROCEDURE — 36415 COLL VENOUS BLD VENIPUNCTURE: CPT

## 2019-01-29 ENCOUNTER — TELEPHONE (OUTPATIENT)
Dept: HEMATOLOGY/ONCOLOGY | Facility: HOSPITAL | Age: 64
End: 2019-01-29

## 2019-01-29 NOTE — TELEPHONE ENCOUNTER
Patient notified of normal PSA results. OV tomorrow. Code to sign up for MyChart will be given to patient tomorrow at check-in.

## 2019-01-30 ENCOUNTER — OFFICE VISIT (OUTPATIENT)
Dept: HEMATOLOGY/ONCOLOGY | Facility: HOSPITAL | Age: 64
End: 2019-01-30
Attending: INTERNAL MEDICINE
Payer: COMMERCIAL

## 2019-01-30 VITALS
RESPIRATION RATE: 18 BRPM | HEART RATE: 84 BPM | SYSTOLIC BLOOD PRESSURE: 128 MMHG | HEIGHT: 74 IN | TEMPERATURE: 98 F | WEIGHT: 242 LBS | DIASTOLIC BLOOD PRESSURE: 86 MMHG | BODY MASS INDEX: 31.06 KG/M2

## 2019-01-30 DIAGNOSIS — C61 PROSTATE CANCER (HCC): Primary | ICD-10-CM

## 2019-01-30 PROCEDURE — 99213 OFFICE O/P EST LOW 20 MIN: CPT | Performed by: INTERNAL MEDICINE

## 2019-01-30 RX ORDER — ONDANSETRON 4 MG/1
4 TABLET, ORALLY DISINTEGRATING ORAL EVERY 8 HOURS PRN
COMMUNITY
End: 2020-07-08 | Stop reason: ALTCHOICE

## 2019-01-30 RX ORDER — MECLIZINE HYDROCHLORIDE 25 MG/1
25 TABLET ORAL 3 TIMES DAILY PRN
COMMUNITY
End: 2020-07-08 | Stop reason: ALTCHOICE

## 2019-01-30 NOTE — PROGRESS NOTES
[facility]    PATIENT'S NAME: Alpa Parnell   ATTENDING PHYSICIAN: Mariam Borges MD   PATIENT ACCOUNT #: [de-identified] LOCATION: [address] [place] [address] RECORD #: [id number] YOB: 1955   DATE OF SERVICE: 01/30/2019       CANCER CE to date with regard to other cancer screening including colonoscopy which was done around the time of his diagnosis of prostate cancer, another one was planned at the 5-year interval.  His current medications include alprazolam 0.25 mg b.i.d. p.r.n., aspir am planning on seeing him only in 6 months. He is going to get PSAs done at 3-month intervals.     Dictated By Penny Hylton MD  d: 01/30/2019 10:12:31  t: 01/30/2019 11:06:51  [facility] [id number]  /    cc: MD Whitney Porras M

## 2019-04-09 RX ORDER — TRIAMCINOLONE ACETONIDE 1 MG/G
CREAM TOPICAL
Qty: 30 G | Refills: 0 | Status: SHIPPED | OUTPATIENT
Start: 2019-04-09 | End: 2020-07-08 | Stop reason: ALTCHOICE

## 2019-07-31 ENCOUNTER — OFFICE VISIT (OUTPATIENT)
Dept: HEMATOLOGY/ONCOLOGY | Facility: HOSPITAL | Age: 64
End: 2019-07-31
Attending: INTERNAL MEDICINE
Payer: COMMERCIAL

## 2019-07-31 ENCOUNTER — TELEPHONE (OUTPATIENT)
Dept: HEMATOLOGY/ONCOLOGY | Facility: HOSPITAL | Age: 64
End: 2019-07-31

## 2019-07-31 VITALS
DIASTOLIC BLOOD PRESSURE: 67 MMHG | HEIGHT: 74 IN | OXYGEN SATURATION: 95 % | TEMPERATURE: 99 F | RESPIRATION RATE: 16 BRPM | BODY MASS INDEX: 31.44 KG/M2 | SYSTOLIC BLOOD PRESSURE: 129 MMHG | HEART RATE: 82 BPM | WEIGHT: 245 LBS

## 2019-07-31 DIAGNOSIS — R53.83 OTHER FATIGUE: ICD-10-CM

## 2019-07-31 DIAGNOSIS — C61 PROSTATE CANCER (HCC): Primary | ICD-10-CM

## 2019-07-31 LAB
PSA SERPL-MCNC: <0.01 NG/ML (ref ?–4)
TESTOST SERPL-MCNC: 96.78 NG/DL
VIT B12 SERPL-MCNC: 361 PG/ML (ref 193–986)

## 2019-07-31 PROCEDURE — 99214 OFFICE O/P EST MOD 30 MIN: CPT | Performed by: INTERNAL MEDICINE

## 2019-07-31 NOTE — PROGRESS NOTES
[facility]    PATIENT'S NAME: Joey Hudson   ATTENDING PHYSICIAN: Ankita Langford MD   PATIENT ACCOUNT #: [de-identified] LOCATION: [address] [place] [address] RECORD #: [id number] YOB: 1955   DATE OF SERVICE: 07/31/2019       CANCER CE lisinopril/hydrochlorothiazide 20/12.5; meclizine 25 mg t.i.d. p.r.n.; naproxen 500 mg p.r.n.; ondansetron 4 mg q.8 h. p.r.n.; triamcinolone topical cream b.i.d. p.r.n.; venlafaxine extended release 75 mg daily.   He had been given a dose of vitamin B12 in 15:20:39  t: 07/31/2019 15:32:59  Job [id number]  /    cc: MD Lindsay Bazan MD Georgeann Hug, MD Dr. Reno Honer.  Delicia Regalado

## 2019-12-09 RX ORDER — VENLAFAXINE HYDROCHLORIDE 75 MG/1
CAPSULE, EXTENDED RELEASE ORAL
Qty: 90 CAPSULE | Refills: 1 | Status: SHIPPED | OUTPATIENT
Start: 2019-12-09 | End: 2020-06-08

## 2020-06-08 DIAGNOSIS — C61 PROSTATE CANCER (HCC): Primary | ICD-10-CM

## 2020-06-08 RX ORDER — VENLAFAXINE HYDROCHLORIDE 75 MG/1
CAPSULE, EXTENDED RELEASE ORAL
Qty: 90 CAPSULE | Refills: 0 | Status: SHIPPED | OUTPATIENT
Start: 2020-06-08 | End: 2020-09-08

## 2020-06-17 ENCOUNTER — APPOINTMENT (OUTPATIENT)
Dept: LAB | Facility: HOSPITAL | Age: 65
End: 2020-06-17
Payer: COMMERCIAL

## 2020-06-17 ENCOUNTER — TELEPHONE (OUTPATIENT)
Dept: HEMATOLOGY/ONCOLOGY | Facility: HOSPITAL | Age: 65
End: 2020-06-17

## 2020-06-17 DIAGNOSIS — C61 PROSTATE CANCER (HCC): ICD-10-CM

## 2020-06-17 PROCEDURE — 36415 COLL VENOUS BLD VENIPUNCTURE: CPT

## 2020-06-17 PROCEDURE — 84153 ASSAY OF PSA TOTAL: CPT

## 2020-07-08 ENCOUNTER — OFFICE VISIT (OUTPATIENT)
Dept: HEMATOLOGY/ONCOLOGY | Facility: HOSPITAL | Age: 65
End: 2020-07-08
Attending: INTERNAL MEDICINE
Payer: COMMERCIAL

## 2020-07-08 VITALS
TEMPERATURE: 97 F | WEIGHT: 241 LBS | HEIGHT: 74 IN | RESPIRATION RATE: 18 BRPM | DIASTOLIC BLOOD PRESSURE: 69 MMHG | BODY MASS INDEX: 30.93 KG/M2 | OXYGEN SATURATION: 98 % | HEART RATE: 66 BPM | SYSTOLIC BLOOD PRESSURE: 119 MMHG

## 2020-07-08 DIAGNOSIS — C61 PROSTATE CANCER (HCC): Primary | ICD-10-CM

## 2020-07-08 PROCEDURE — 99213 OFFICE O/P EST LOW 20 MIN: CPT | Performed by: INTERNAL MEDICINE

## 2020-07-08 RX ORDER — ATORVASTATIN CALCIUM 40 MG/1
TABLET, FILM COATED ORAL
COMMUNITY
Start: 2020-06-17

## 2020-07-09 NOTE — PROGRESS NOTES
[facility]    PATIENT'S NAME: Andrea Khalil   ATTENDING PHYSICIAN: Clara Nascimento MD   PATIENT ACCOUNT #: [de-identified] LOCATION: [address] [place] [address] RECORD #: [id number] YOB: 1955   DATE OF SERVICE: 07/08/2020       CANCER CE supraclavicular, or axillary adenopathy. LUNGS:  Resonant to percussion and clear to auscultation, with no wheezing, rales, or rhonchi. HEART:  Normal.    ABDOMEN:  No hepatosplenomegaly or tenderness.    EXTREMITIES:  He has no clubbing, cyanosis, or e

## 2020-09-08 RX ORDER — VENLAFAXINE HYDROCHLORIDE 75 MG/1
CAPSULE, EXTENDED RELEASE ORAL
Qty: 90 CAPSULE | Refills: 1 | Status: SHIPPED | OUTPATIENT
Start: 2020-09-08 | End: 2021-03-04

## 2020-12-07 ENCOUNTER — LAB REQUISITION (OUTPATIENT)
Dept: LAB | Age: 65
End: 2020-12-07

## 2020-12-07 ENCOUNTER — LAB SERVICES (OUTPATIENT)
Dept: LAB | Age: 65
End: 2020-12-07

## 2020-12-07 DIAGNOSIS — C61 MALIGNANT NEOPLASM OF PROSTATE (CMD): ICD-10-CM

## 2020-12-07 DIAGNOSIS — Z00.00 ENCOUNTER FOR GENERAL ADULT MEDICAL EXAMINATION WITHOUT ABNORMAL FINDINGS: ICD-10-CM

## 2020-12-07 LAB
ALBUMIN SERPL-MCNC: 3.6 G/DL (ref 3.6–5.1)
ALBUMIN/GLOB SERPL: 1 {RATIO} (ref 1–2.4)
ALP SERPL-CCNC: 53 UNITS/L (ref 45–117)
ALT SERPL-CCNC: 28 UNITS/L
ANION GAP SERPL CALC-SCNC: 11 MMOL/L (ref 10–20)
AST SERPL-CCNC: 13 UNITS/L
BASOPHILS # BLD: 0.1 K/MCL (ref 0–0.3)
BASOPHILS NFR BLD: 2 %
BILIRUB SERPL-MCNC: 0.4 MG/DL (ref 0.2–1)
BUN SERPL-MCNC: 17 MG/DL (ref 6–20)
BUN/CREAT SERPL: 21 (ref 7–25)
CALCIUM SERPL-MCNC: 8.6 MG/DL (ref 8.4–10.2)
CHLORIDE SERPL-SCNC: 110 MMOL/L (ref 98–107)
CHOLEST SERPL-MCNC: 231 MG/DL
CHOLEST/HDLC SERPL: 5.3 {RATIO}
CO2 SERPL-SCNC: 25 MMOL/L (ref 21–32)
CREAT SERPL-MCNC: 0.8 MG/DL (ref 0.67–1.17)
DEPRECATED RDW RBC: 41.2 FL (ref 39–50)
EOSINOPHIL # BLD: 0.2 K/MCL (ref 0–0.5)
EOSINOPHIL NFR BLD: 4 %
ERYTHROCYTE [DISTWIDTH] IN BLOOD: 12.9 % (ref 11–15)
FASTING DURATION TIME PATIENT: ABNORMAL H
FASTING DURATION TIME PATIENT: ABNORMAL H
GFR SERPLBLD BASED ON 1.73 SQ M-ARVRAT: >90 ML/MIN/1.73M2
GLOBULIN SER-MCNC: 3.6 G/DL (ref 2–4)
GLUCOSE SERPL-MCNC: 92 MG/DL (ref 65–99)
HCT VFR BLD CALC: 43.1 % (ref 39–51)
HDLC SERPL-MCNC: 44 MG/DL
HGB BLD-MCNC: 13.6 G/DL (ref 13–17)
IMM GRANULOCYTES # BLD AUTO: 0 K/MCL (ref 0–0.2)
IMM GRANULOCYTES # BLD: 0 %
LDLC SERPL CALC-MCNC: 153 MG/DL
LYMPHOCYTES # BLD: 0.8 K/MCL (ref 1–4)
LYMPHOCYTES NFR BLD: 16 %
MCH RBC QN AUTO: 27.6 PG (ref 26–34)
MCHC RBC AUTO-ENTMCNC: 31.6 G/DL (ref 32–36.5)
MCV RBC AUTO: 87.4 FL (ref 78–100)
MONOCYTES # BLD: 0.5 K/MCL (ref 0.3–0.9)
MONOCYTES NFR BLD: 9 %
NEUTROPHILS # BLD: 3.4 K/MCL (ref 1.8–7.7)
NEUTROPHILS NFR BLD: 69 %
NONHDLC SERPL-MCNC: 187 MG/DL
NRBC BLD MANUAL-RTO: 0 /100 WBC
PLATELET # BLD AUTO: 245 K/MCL (ref 140–450)
POTASSIUM SERPL-SCNC: 4.3 MMOL/L (ref 3.4–5.1)
PROT SERPL-MCNC: 7.2 G/DL (ref 6.4–8.2)
PSA SERPL-MCNC: <0.01 NG/ML
RBC # BLD: 4.93 MIL/MCL (ref 4.5–5.9)
SODIUM SERPL-SCNC: 142 MMOL/L (ref 135–145)
TRIGL SERPL-MCNC: 168 MG/DL
TSH SERPL-ACNC: 1.72 MCUNITS/ML (ref 0.35–5)
WBC # BLD: 5 K/MCL (ref 4.2–11)

## 2020-12-07 PROCEDURE — 84153 ASSAY OF PSA TOTAL: CPT | Performed by: CLINICAL MEDICAL LABORATORY

## 2020-12-07 PROCEDURE — 80061 LIPID PANEL: CPT | Performed by: CLINICAL MEDICAL LABORATORY

## 2020-12-07 PROCEDURE — 80050 GENERAL HEALTH PANEL: CPT | Performed by: CLINICAL MEDICAL LABORATORY

## 2021-03-04 RX ORDER — VENLAFAXINE HYDROCHLORIDE 75 MG/1
75 CAPSULE, EXTENDED RELEASE ORAL DAILY
Qty: 90 CAPSULE | Refills: 1 | Status: SHIPPED | OUTPATIENT
Start: 2021-03-04 | End: 2022-05-06

## 2021-04-12 NOTE — TELEPHONE ENCOUNTER
Patient asking did we received the paperwork from his insurance,  They need to know what dr Conrad Adorno did, please advise yes
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
## 2021-12-16 ENCOUNTER — LAB REQUISITION (OUTPATIENT)
Dept: LAB | Age: 66
End: 2021-12-16

## 2021-12-16 DIAGNOSIS — Z00.00 ENCOUNTER FOR GENERAL ADULT MEDICAL EXAMINATION WITHOUT ABNORMAL FINDINGS: ICD-10-CM

## 2021-12-16 DIAGNOSIS — Z12.5 ENCOUNTER FOR SCREENING FOR MALIGNANT NEOPLASM OF PROSTATE: ICD-10-CM

## 2021-12-29 ENCOUNTER — LAB SERVICES (OUTPATIENT)
Dept: LAB | Age: 66
End: 2021-12-29

## 2021-12-29 LAB
ALBUMIN SERPL-MCNC: 3.8 G/DL (ref 3.6–5.1)
ALBUMIN/GLOB SERPL: 1.2 {RATIO} (ref 1–2.4)
ALP SERPL-CCNC: 46 UNITS/L (ref 45–117)
ALT SERPL-CCNC: 32 UNITS/L
ANION GAP SERPL CALC-SCNC: 10 MMOL/L (ref 10–20)
AST SERPL-CCNC: 16 UNITS/L
BASOPHILS # BLD: 0.1 K/MCL (ref 0–0.3)
BASOPHILS NFR BLD: 2 %
BILIRUB SERPL-MCNC: 0.4 MG/DL (ref 0.2–1)
BUN SERPL-MCNC: 21 MG/DL (ref 6–20)
BUN/CREAT SERPL: 29 (ref 7–25)
CALCIUM SERPL-MCNC: 9.2 MG/DL (ref 8.4–10.2)
CHLORIDE SERPL-SCNC: 109 MMOL/L (ref 98–107)
CHOLEST SERPL-MCNC: 168 MG/DL
CHOLEST/HDLC SERPL: 4.4 {RATIO}
CO2 SERPL-SCNC: 27 MMOL/L (ref 21–32)
CREAT SERPL-MCNC: 0.73 MG/DL (ref 0.67–1.17)
DEPRECATED RDW RBC: 40.7 FL (ref 39–50)
EOSINOPHIL # BLD: 0.2 K/MCL (ref 0–0.5)
EOSINOPHIL NFR BLD: 4 %
ERYTHROCYTE [DISTWIDTH] IN BLOOD: 12.8 % (ref 11–15)
FASTING DURATION TIME PATIENT: 12 HOURS (ref 0–999)
FASTING DURATION TIME PATIENT: 12 HOURS (ref 0–999)
GFR SERPLBLD BASED ON 1.73 SQ M-ARVRAT: >90 ML/MIN
GLOBULIN SER-MCNC: 3.3 G/DL (ref 2–4)
GLUCOSE SERPL-MCNC: 95 MG/DL (ref 70–99)
HCT VFR BLD CALC: 42.2 % (ref 39–51)
HDLC SERPL-MCNC: 38 MG/DL
HGB BLD-MCNC: 13.3 G/DL (ref 13–17)
IMM GRANULOCYTES # BLD AUTO: 0 K/MCL (ref 0–0.2)
IMM GRANULOCYTES # BLD: 0 %
LDLC SERPL CALC-MCNC: 105 MG/DL
LYMPHOCYTES # BLD: 0.9 K/MCL (ref 1–4)
LYMPHOCYTES NFR BLD: 22 %
MCH RBC QN AUTO: 27.5 PG (ref 26–34)
MCHC RBC AUTO-ENTMCNC: 31.5 G/DL (ref 32–36.5)
MCV RBC AUTO: 87.2 FL (ref 78–100)
MONOCYTES # BLD: 0.4 K/MCL (ref 0.3–0.9)
MONOCYTES NFR BLD: 11 %
NEUTROPHILS # BLD: 2.5 K/MCL (ref 1.8–7.7)
NEUTROPHILS NFR BLD: 61 %
NONHDLC SERPL-MCNC: 130 MG/DL
NRBC BLD MANUAL-RTO: 0 /100 WBC
PLATELET # BLD AUTO: 231 K/MCL (ref 140–450)
POTASSIUM SERPL-SCNC: 5 MMOL/L (ref 3.4–5.1)
PROT SERPL-MCNC: 7.1 G/DL (ref 6.4–8.2)
PSA SERPL-MCNC: <0.01 NG/ML
RBC # BLD: 4.84 MIL/MCL (ref 4.5–5.9)
SODIUM SERPL-SCNC: 141 MMOL/L (ref 135–145)
TRIGL SERPL-MCNC: 125 MG/DL
TSH SERPL-ACNC: 1.14 MCUNITS/ML (ref 0.35–5)
WBC # BLD: 4 K/MCL (ref 4.2–11)

## 2021-12-29 PROCEDURE — PSEU9050 PSA: Performed by: CLINICAL MEDICAL LABORATORY

## 2021-12-29 PROCEDURE — PSEU8168 THYROID STIMULATING HORMONE REFLEX: Performed by: CLINICAL MEDICAL LABORATORY

## 2021-12-29 PROCEDURE — PSEU8250 COMPREHENSIVE METABOLIC PANEL: Performed by: CLINICAL MEDICAL LABORATORY

## 2021-12-29 PROCEDURE — 36415 COLL VENOUS BLD VENIPUNCTURE: CPT | Performed by: CLINICAL MEDICAL LABORATORY

## 2021-12-29 PROCEDURE — 84443 ASSAY THYROID STIM HORMONE: CPT | Performed by: CLINICAL MEDICAL LABORATORY

## 2021-12-29 PROCEDURE — 80053 COMPREHEN METABOLIC PANEL: CPT | Performed by: CLINICAL MEDICAL LABORATORY

## 2021-12-29 PROCEDURE — 80061 LIPID PANEL: CPT | Performed by: CLINICAL MEDICAL LABORATORY

## 2021-12-29 PROCEDURE — 85025 COMPLETE CBC W/AUTO DIFF WBC: CPT | Performed by: CLINICAL MEDICAL LABORATORY

## 2021-12-29 PROCEDURE — 84153 ASSAY OF PSA TOTAL: CPT | Performed by: CLINICAL MEDICAL LABORATORY

## 2021-12-29 PROCEDURE — PSEU8135 LIPID PANEL WITH REFLEX: Performed by: CLINICAL MEDICAL LABORATORY

## 2022-05-06 RX ORDER — VENLAFAXINE HYDROCHLORIDE 75 MG/1
75 CAPSULE, EXTENDED RELEASE ORAL DAILY
Qty: 90 CAPSULE | Refills: 0 | Status: SHIPPED | OUTPATIENT
Start: 2022-05-06 | End: 2022-07-26

## 2022-05-11 ENCOUNTER — OFFICE VISIT (OUTPATIENT)
Dept: HEMATOLOGY/ONCOLOGY | Facility: HOSPITAL | Age: 67
End: 2022-05-11
Attending: INTERNAL MEDICINE
Payer: COMMERCIAL

## 2022-05-11 VITALS
RESPIRATION RATE: 16 BRPM | BODY MASS INDEX: 30.29 KG/M2 | HEIGHT: 74 IN | SYSTOLIC BLOOD PRESSURE: 127 MMHG | OXYGEN SATURATION: 96 % | WEIGHT: 236 LBS | TEMPERATURE: 98 F | DIASTOLIC BLOOD PRESSURE: 65 MMHG | HEART RATE: 76 BPM

## 2022-05-11 DIAGNOSIS — C61 PROSTATE CANCER (HCC): Primary | ICD-10-CM

## 2022-05-11 PROCEDURE — 99213 OFFICE O/P EST LOW 20 MIN: CPT | Performed by: INTERNAL MEDICINE

## 2022-05-11 NOTE — PROGRESS NOTES
[facility]    PATIENT'S NAME: Cece North   ATTENDING PHYSICIAN: Kelley Valdovinos MD   PATIENT ACCOUNT #: [de-identified] LOCATION: [address] [place] [address] RECORD #: [id number] YOB: 1955   DATE OF SERVICE: 05/11/2022       CANCER CENTER PROGRESS NOTE    CHIEF COMPLAINT:  Followup for history of prostate cancer. HISTORY OF PRESENT ILLNESS:  The patient is a 71-year-old male. He had a T3N1b high-grade prostate cancer with a positive margin. He had bilateral seminal vesicle involvement and involvement of a lymph node. History of postsurgical adjuvant radiotherapy and 2 years of androgen deprivation therapy. He began in December of 2016, and he finished his radiation in early 2017. His last dose of ADT was given in April of 2018, and he is now 4 years out from this. He feels well. He states his energy level is good. He is active. He is exercising regularly. He swims several times a week. He has a pool in his backyard now. He rides a [employer] several days a week. He has had relatively low testosterone levels afterwards, but we have not really checked this lately. I told him there is not much point in it, given the fact that we are not going to be giving him testosterone supplementation. He is not having any major issues with hot flashes. His mood is no longer a big problem. He is working full-time. 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 I last saw him nearly 2 years ago. He sees Dr. Marcelino Cristina for his primary care. He did have a PSA done at the beginning of the year, that was undetectable. This was done with his regular labs. CURRENT MEDICATIONS:  Include vitamin D 1000 units daily, venlafaxine extended release 75 mg daily, and lisinopril/hydrochlorothiazide 20/12.5 daily. PHYSICAL EXAMINATION:    GENERAL:  He is a well-appearing male in no acute distress. VITAL SIGNS:  His performance status is 0. His weight is 236 pounds. He is 6 feet, 2.   Blood pressure is 127/65, pulse 76, respiratory rate is 20, temperature is 98.0. HEENT:  Unremarkable. LYMPHATICS:  He has no adenopathy. LUNGS:  Clear. HEART:  Normal.  ABDOMEN:  Reveals no hepatosplenomegaly or tenderness. EXTREMITIES:  He has no clubbing, cyanosis, or edema. NEUROLOGIC:  He is intact. LABORATORY DATA:  PSA will be done this summer. IMPRESSION:  Prostate cancer. He is 4 years out from completion of radiation. He took androgen deprivation therapy for 2 years. He has had an undetectable PSA. He is going to continue to do these twice a year. I put him down to see us again, in a year briefly. He should have his PSA checked twice yearly at least until year 5, and then minimum once a year beyond that point until at least 10 years after completion of the radiation therapy. Dictated By Julain Perez MD  d: 05/11/2022 13:44:20  t: 05/11/2022 14:48:10  [facility] [id number]  HF/    cc: MD Rosa Lozoya.  Roopa Cheema MD

## 2022-05-15 NOTE — PROGRESS NOTES
[facility] Radiation Treatment Management Note 21-25    Patient:  Jasson Bañuelos  Age:  62year old  Visit Diagnosis:    1.  Prostate cancer [facility])      Primary Rad/Onc:  Dr. Scott Diehl Goodman      Site Delivered Dose (Gy) Prescribed Dose (Gy) Pedrito Willis No

## 2022-07-26 RX ORDER — VENLAFAXINE HYDROCHLORIDE 75 MG/1
75 CAPSULE, EXTENDED RELEASE ORAL DAILY
Qty: 90 CAPSULE | Refills: 2 | Status: SHIPPED | OUTPATIENT
Start: 2022-07-26

## 2023-02-09 ENCOUNTER — LAB REQUISITION (OUTPATIENT)
Dept: LAB | Age: 68
End: 2023-02-09

## 2023-02-09 DIAGNOSIS — I10 ESSENTIAL (PRIMARY) HYPERTENSION: ICD-10-CM

## 2023-02-09 DIAGNOSIS — C61 MALIGNANT NEOPLASM OF PROSTATE (CMD): ICD-10-CM

## 2023-02-15 ENCOUNTER — LAB SERVICES (OUTPATIENT)
Dept: LAB | Age: 68
End: 2023-02-15

## 2023-02-15 LAB
ALBUMIN SERPL-MCNC: 3.9 G/DL (ref 3.6–5.1)
ALBUMIN/GLOB SERPL: 1.1 {RATIO} (ref 1–2.4)
ALP SERPL-CCNC: 49 UNITS/L (ref 45–117)
ALT SERPL-CCNC: 41 UNITS/L
ANION GAP SERPL CALC-SCNC: 9 MMOL/L (ref 7–19)
APPEARANCE UR: CLEAR
AST SERPL-CCNC: 24 UNITS/L
BACTERIA #/AREA URNS HPF: NORMAL /HPF
BILIRUB SERPL-MCNC: 0.6 MG/DL (ref 0.2–1)
BILIRUB UR QL STRIP: NEGATIVE
BUN SERPL-MCNC: 15 MG/DL (ref 6–20)
BUN/CREAT SERPL: 19 (ref 7–25)
CALCIUM SERPL-MCNC: 9.3 MG/DL (ref 8.4–10.2)
CHLORIDE SERPL-SCNC: 108 MMOL/L (ref 97–110)
CHOLEST SERPL-MCNC: 184 MG/DL
CHOLEST/HDLC SERPL: 3.9 {RATIO}
CO2 SERPL-SCNC: 27 MMOL/L (ref 21–32)
COLOR UR: NORMAL
CREAT SERPL-MCNC: 0.79 MG/DL (ref 0.67–1.17)
FASTING DURATION TIME PATIENT: 12 HOURS (ref 0–999)
FASTING DURATION TIME PATIENT: 12 HOURS (ref 0–999)
GFR SERPLBLD BASED ON 1.73 SQ M-ARVRAT: >90 ML/MIN
GLOBULIN SER-MCNC: 3.4 G/DL (ref 2–4)
GLUCOSE SERPL-MCNC: 101 MG/DL (ref 70–99)
GLUCOSE UR STRIP-MCNC: NEGATIVE MG/DL
HDLC SERPL-MCNC: 47 MG/DL
HGB UR QL STRIP: NEGATIVE
HYALINE CASTS #/AREA URNS LPF: NORMAL /LPF
KETONES UR STRIP-MCNC: NEGATIVE MG/DL
LDLC SERPL CALC-MCNC: 116 MG/DL
LEUKOCYTE ESTERASE UR QL STRIP: NEGATIVE
NITRITE UR QL STRIP: NEGATIVE
NONHDLC SERPL-MCNC: 137 MG/DL
PH UR STRIP: 6 [PH] (ref 5–7)
POTASSIUM SERPL-SCNC: 4.4 MMOL/L (ref 3.4–5.1)
PROT SERPL-MCNC: 7.3 G/DL (ref 6.4–8.2)
PROT UR STRIP-MCNC: NEGATIVE MG/DL
PSA SERPL-MCNC: <0.01 NG/ML
RBC #/AREA URNS HPF: NORMAL /HPF
SODIUM SERPL-SCNC: 140 MMOL/L (ref 135–145)
SP GR UR STRIP: 1.02 (ref 1–1.03)
SQUAMOUS #/AREA URNS HPF: NORMAL /HPF
TRIGL SERPL-MCNC: 103 MG/DL
UROBILINOGEN UR STRIP-MCNC: 0.2 MG/DL
WBC #/AREA URNS HPF: NORMAL /HPF

## 2023-02-15 PROCEDURE — 81001 URINALYSIS AUTO W/SCOPE: CPT | Performed by: CLINICAL MEDICAL LABORATORY

## 2023-02-15 PROCEDURE — 80061 LIPID PANEL: CPT | Performed by: CLINICAL MEDICAL LABORATORY

## 2023-02-15 PROCEDURE — 36415 COLL VENOUS BLD VENIPUNCTURE: CPT | Performed by: CLINICAL MEDICAL LABORATORY

## 2023-02-15 PROCEDURE — 84153 ASSAY OF PSA TOTAL: CPT | Performed by: CLINICAL MEDICAL LABORATORY

## 2023-02-15 PROCEDURE — 80053 COMPREHEN METABOLIC PANEL: CPT | Performed by: CLINICAL MEDICAL LABORATORY

## 2023-05-17 ENCOUNTER — APPOINTMENT (OUTPATIENT)
Dept: HEMATOLOGY/ONCOLOGY | Facility: HOSPITAL | Age: 68
End: 2023-05-17
Attending: INTERNAL MEDICINE
Payer: COMMERCIAL

## 2023-10-02 ENCOUNTER — LAB REQUISITION (OUTPATIENT)
Dept: LAB | Age: 68
End: 2023-10-02

## 2023-10-02 DIAGNOSIS — R10.32 LEFT LOWER QUADRANT PAIN: ICD-10-CM

## 2023-10-02 DIAGNOSIS — C61 MALIGNANT NEOPLASM OF PROSTATE (CMD): ICD-10-CM

## 2023-10-03 ENCOUNTER — LAB SERVICES (OUTPATIENT)
Dept: LAB | Age: 68
End: 2023-10-03

## 2023-10-03 PROCEDURE — 84153 ASSAY OF PSA TOTAL: CPT | Performed by: CLINICAL MEDICAL LABORATORY

## 2023-10-03 PROCEDURE — 80053 COMPREHEN METABOLIC PANEL: CPT | Performed by: CLINICAL MEDICAL LABORATORY

## 2023-10-03 PROCEDURE — 85025 COMPLETE CBC W/AUTO DIFF WBC: CPT | Performed by: CLINICAL MEDICAL LABORATORY

## 2023-10-03 PROCEDURE — 81001 URINALYSIS AUTO W/SCOPE: CPT | Performed by: CLINICAL MEDICAL LABORATORY

## 2023-10-04 LAB
ALBUMIN SERPL-MCNC: 3.8 G/DL (ref 3.6–5.1)
ALBUMIN/GLOB SERPL: 1.1 {RATIO} (ref 1–2.4)
ALP SERPL-CCNC: 51 UNITS/L (ref 45–117)
ALT SERPL-CCNC: 31 UNITS/L
ANION GAP SERPL CALC-SCNC: 11 MMOL/L (ref 7–19)
APPEARANCE UR: CLEAR
AST SERPL-CCNC: 10 UNITS/L
BACTERIA #/AREA URNS HPF: NORMAL /HPF
BASOPHILS # BLD: 0.1 K/MCL (ref 0–0.3)
BASOPHILS NFR BLD: 2 %
BILIRUB SERPL-MCNC: 0.6 MG/DL (ref 0.2–1)
BILIRUB UR QL STRIP: NEGATIVE
BUN SERPL-MCNC: 17 MG/DL (ref 6–20)
BUN/CREAT SERPL: 23 (ref 7–25)
CALCIUM SERPL-MCNC: 9 MG/DL (ref 8.4–10.2)
CHLORIDE SERPL-SCNC: 108 MMOL/L (ref 97–110)
CO2 SERPL-SCNC: 25 MMOL/L (ref 21–32)
COLOR UR: YELLOW
CREAT SERPL-MCNC: 0.75 MG/DL (ref 0.67–1.17)
DEPRECATED RDW RBC: 43.2 FL (ref 39–50)
EGFRCR SERPLBLD CKD-EPI 2021: >90 ML/MIN/{1.73_M2}
EOSINOPHIL # BLD: 0.2 K/MCL (ref 0–0.5)
EOSINOPHIL NFR BLD: 4 %
ERYTHROCYTE [DISTWIDTH] IN BLOOD: 13.2 % (ref 11–15)
FASTING DURATION TIME PATIENT: 12 HOURS (ref 0–999)
GLOBULIN SER-MCNC: 3.4 G/DL (ref 2–4)
GLUCOSE SERPL-MCNC: 96 MG/DL (ref 70–99)
GLUCOSE UR STRIP-MCNC: NEGATIVE MG/DL
HCT VFR BLD CALC: 44.5 % (ref 39–51)
HGB BLD-MCNC: 13.6 G/DL (ref 13–17)
HGB UR QL STRIP: NEGATIVE
HYALINE CASTS #/AREA URNS LPF: NORMAL /LPF
IMM GRANULOCYTES # BLD AUTO: 0 K/MCL (ref 0–0.2)
IMM GRANULOCYTES # BLD: 0 %
KETONES UR STRIP-MCNC: NEGATIVE MG/DL
LEUKOCYTE ESTERASE UR QL STRIP: NEGATIVE
LYMPHOCYTES # BLD: 0.7 K/MCL (ref 1–4)
LYMPHOCYTES NFR BLD: 17 %
MCH RBC QN AUTO: 27.5 PG (ref 26–34)
MCHC RBC AUTO-ENTMCNC: 30.6 G/DL (ref 32–36.5)
MCV RBC AUTO: 89.9 FL (ref 78–100)
MONOCYTES # BLD: 0.4 K/MCL (ref 0.3–0.9)
MONOCYTES NFR BLD: 9 %
MUCOUS THREADS URNS QL MICRO: PRESENT
NEUTROPHILS # BLD: 2.8 K/MCL (ref 1.8–7.7)
NEUTROPHILS NFR BLD: 68 %
NITRITE UR QL STRIP: NEGATIVE
NRBC BLD MANUAL-RTO: 0 /100 WBC
PH UR STRIP: 5.5 [PH] (ref 5–7)
PLATELET # BLD AUTO: 237 K/MCL (ref 140–450)
POTASSIUM SERPL-SCNC: 4.3 MMOL/L (ref 3.4–5.1)
PROT SERPL-MCNC: 7.2 G/DL (ref 6.4–8.2)
PROT UR STRIP-MCNC: NEGATIVE MG/DL
PSA SERPL-MCNC: <0.01 NG/ML
RBC # BLD: 4.95 MIL/MCL (ref 4.5–5.9)
RBC #/AREA URNS HPF: NORMAL /HPF
SODIUM SERPL-SCNC: 140 MMOL/L (ref 135–145)
SP GR UR STRIP: 1.02 (ref 1–1.03)
SQUAMOUS #/AREA URNS HPF: NORMAL /HPF
UROBILINOGEN UR STRIP-MCNC: 0.2 MG/DL
WBC # BLD: 4.1 K/MCL (ref 4.2–11)
WBC #/AREA URNS HPF: NORMAL /HPF

## 2023-11-27 ENCOUNTER — HOSPITAL ENCOUNTER (OUTPATIENT)
Dept: CT IMAGING | Facility: HOSPITAL | Age: 68
Discharge: HOME | End: 2023-11-27
Attending: FAMILY MEDICINE
Payer: MEDICARE

## 2023-11-27 DIAGNOSIS — R10.32 LLQ ABDOMINAL PAIN: ICD-10-CM

## 2023-11-27 LAB
CREAT BLD-MCNC: 0.7 MG/DL
EGFRCR SERPLBLD CKD-EPI 2021: 101 ML/MIN/1.73M2 (ref 60–?)

## 2023-11-27 PROCEDURE — 74177 CT ABD & PELVIS W/CONTRAST: CPT | Performed by: FAMILY MEDICINE

## 2023-11-27 PROCEDURE — 82565 ASSAY OF CREATININE: CPT

## 2025-02-19 ENCOUNTER — LAB REQUISITION (OUTPATIENT)
Dept: LAB | Age: 70
End: 2025-02-19

## 2025-02-19 ENCOUNTER — LAB SERVICES (OUTPATIENT)
Dept: LAB | Age: 70
End: 2025-02-19

## 2025-02-19 DIAGNOSIS — I10 ESSENTIAL (PRIMARY) HYPERTENSION: ICD-10-CM

## 2025-02-19 DIAGNOSIS — C61 MALIGNANT NEOPLASM OF PROSTATE  (CMD): ICD-10-CM

## 2025-02-19 LAB
ALBUMIN SERPL-MCNC: 3.9 G/DL (ref 3.4–5)
ALBUMIN/GLOB SERPL: 1.1 {RATIO} (ref 1–2.4)
ALP SERPL-CCNC: 46 UNITS/L (ref 45–117)
ALT SERPL-CCNC: 36 UNITS/L
ANION GAP SERPL CALC-SCNC: 12 MMOL/L (ref 7–19)
AST SERPL-CCNC: 14 UNITS/L
BILIRUB SERPL-MCNC: 0.5 MG/DL (ref 0.2–1)
BUN SERPL-MCNC: 16 MG/DL (ref 6–20)
BUN/CREAT SERPL: 24 (ref 7–25)
CALCIUM SERPL-MCNC: 9.4 MG/DL (ref 8.4–10.2)
CHLORIDE SERPL-SCNC: 109 MMOL/L (ref 97–110)
CHOLEST SERPL-MCNC: 202 MG/DL
CHOLEST/HDLC SERPL: 4.1 {RATIO}
CO2 SERPL-SCNC: 24 MMOL/L (ref 21–32)
CREAT SERPL-MCNC: 0.67 MG/DL (ref 0.67–1.17)
EGFRCR SERPLBLD CKD-EPI 2021: >90 ML/MIN/{1.73_M2}
FASTING DURATION TIME PATIENT: 12 HOURS (ref 0–999)
GLOBULIN SER-MCNC: 3.5 G/DL (ref 2–4)
GLUCOSE SERPL-MCNC: 98 MG/DL (ref 70–99)
HDLC SERPL-MCNC: 49 MG/DL
LDLC SERPL CALC-MCNC: 124 MG/DL
NONHDLC SERPL-MCNC: 153 MG/DL
POTASSIUM SERPL-SCNC: 4.3 MMOL/L (ref 3.4–5.1)
PROT SERPL-MCNC: 7.4 G/DL (ref 6.4–8.2)
PSA SERPL-MCNC: <0.01 NG/ML
SODIUM SERPL-SCNC: 141 MMOL/L (ref 135–145)
TRIGL SERPL-MCNC: 145 MG/DL

## 2025-02-19 PROCEDURE — 80053 COMPREHEN METABOLIC PANEL: CPT | Performed by: CLINICAL MEDICAL LABORATORY

## 2025-02-19 PROCEDURE — 84153 ASSAY OF PSA TOTAL: CPT | Performed by: CLINICAL MEDICAL LABORATORY

## 2025-02-19 PROCEDURE — 80061 LIPID PANEL: CPT | Performed by: CLINICAL MEDICAL LABORATORY

## 2025-08-21 ENCOUNTER — HOSPITAL ENCOUNTER (OUTPATIENT)
Dept: CT IMAGING | Facility: HOSPITAL | Age: 70
Discharge: HOME | End: 2025-08-21
Attending: FAMILY MEDICINE

## 2025-08-21 DIAGNOSIS — I88.0 NONSPECIFIC MESENTERIC LYMPHADENITIS: ICD-10-CM

## 2025-08-21 PROCEDURE — 74177 CT ABD & PELVIS W/CONTRAST: CPT | Performed by: FAMILY MEDICINE

## (undated) NOTE — MR AVS SNAPSHOT
Keven Cole   3/13/2017 8:40 AM   Office Visit   MRN:  [id number]    Description:  Male : 1955   Provider:  Narayan Arreguin   Department:  [address]              Visit Summary      [facility]

## (undated) NOTE — MR AVS SNAPSHOT
Ira Guzman   2017 8:40 AM   Office Visit   MRN:  [id number]    Description:  Male : 1955   Provider:  Martin Suarez   Department:  [address] [place] [address]              Visit Summary      [facility] Call the [employer] for assistance with your inactive GreenNote account    If you have questions, you can call [phone] to talk to our [facility] Staff. Remember, [employer] is NOT to be used for urgent needs. For medical emergencies, dial 911.     Vi

## (undated) NOTE — MR AVS SNAPSHOT
Keven Cole   2017 8:40 AM   Office Visit   MRN:  [id number]    Description:  Male : 1955   Provider:  Alcide River; Wilton Crigler   Department:  [address] [place] [address]              Visit Summary      [facility]

## (undated) NOTE — MR AVS SNAPSHOT
Samantha Saliva   2017 8:40 AM   Office Visit   MRN:  [id number]    Description:  Male : 1955   Provider:  Phoebe Talavera   Department:  [address] [place] [address]              Visit Summary      Mariely Thursday May 25, 2017 2:30 PM     Appointment with [facility] at [address] [facility] ([phone])   [address]   [facility] [address]            MyChart     Call the [facility] for assistance with your inactive Greenmonsterhart account

## (undated) NOTE — MR AVS SNAPSHOT
After Visit Summary   3/6/2017    Alfonso Kirk    MRN: [id number]           Diagnoses this Visit     Prostate cancer [facility])    -  Primary       Allergies     No Known Allergies      Your Vital Signs Were     BP Pulse Resp Weight Smoking Status

## (undated) NOTE — MR AVS SNAPSHOT
Kwame Moreira   2017 2:30 PM   Nurse Only   MRN:  [id number]    Description:  Male : 1955   Department:  [address] [place] [address]              Visit Summary      Primary Visit Diagnosis     Prostate cancer

## (undated) NOTE — MR AVS SNAPSHOT
Renu Valle   2017 11:00 AM   Office Visit   MRN:  [id number]    Description:  Male : 1955   Provider:  Osman Martinez   Department:  [facility]              Visit Summary      Aller

## (undated) NOTE — MR AVS SNAPSHOT
Bijanflora Baez   2017 8:40 AM   Office Visit   MRN:  [id number]    Description:  Male : 1955   Provider:  Celestino Morales   Department:  [address] [place] [address]              Visit Summary      [facility] Support Staff. Remember, [employer] is NOT to be used for urgent needs. For medical emergencies, dial 911. Visit [email or web address] to learn more.

## (undated) NOTE — MR AVS SNAPSHOT
Jasson Bañuelos   2017 12:30 PM   Office Visit   MRN:  [id number]    Description:  Male : 1955   Provider:  Mera Escalante   Department:  [facility] Hematology Oncology              Visit Summary      Primary Visit Sherrell Gerber Appointment with [facility] at [address] [place] [address] ([phone])   [address]   [address] [place] [address]       Wednesday October 25, 2017     LAB:  LEID[facility] Arcos     Call the [facility] for assistance with your inactive

## (undated) NOTE — MR AVS SNAPSHOT
Flor Del Angel   3/20/2017 8:30 AM   Office Visit   MRN:  [id number]    Description:  Male : 1955   Provider:  Jose Jimenez   Department:  [address] [place] [address]              Visit Summary      [facility]

## (undated) NOTE — MR AVS SNAPSHOT
Ora Mcnair   2017 8:40 AM   Office Visit   MRN:  [id number]    Description:  Male : 1955   Provider:  Jaylene Freeman   Department:  [address] [place] [address]              Visit Summary      [facility] [address] [facility] [address] [facility] [address]       Monday March 13, 2017 8:40 AM     Appointment with Dimitris Altman; Jonathan Anguiano at [address] ([phone])   [address] [facility] [address]   [address] [facility] [address]       Thursday May 25, 2